# Patient Record
Sex: FEMALE | Race: WHITE
[De-identification: names, ages, dates, MRNs, and addresses within clinical notes are randomized per-mention and may not be internally consistent; named-entity substitution may affect disease eponyms.]

---

## 2017-07-05 ENCOUNTER — HOSPITAL ENCOUNTER (OUTPATIENT)
Dept: HOSPITAL 76 - DI | Age: 62
Discharge: HOME | End: 2017-07-05
Attending: FAMILY MEDICINE
Payer: COMMERCIAL

## 2017-07-05 DIAGNOSIS — Z12.31: Primary | ICD-10-CM

## 2017-07-05 PROCEDURE — 77067 SCR MAMMO BI INCL CAD: CPT

## 2017-07-06 NOTE — MAMMOGRAPHY REPORT
DIGITAL SCREENING MAMMOGRAM: 07/05/2017

 

CLINICAL INDICATION: A 62-year-old with history of late childbearing, for screening. 

 

COMPARISON: 01/2016, 11/2014, 09/2013, 09/2012, 06/2011, 05/2010, 02/2009, 10/2007. 

 

TECHNIQUE:  Routine CC and MLO projections were obtained of the breasts. Bilateral laterally exaggera
jessica craniocaudal views. 

 

FINDINGS:  Parenchymal tissue within both breasts is heterogeneously dense, which may lower the sensi
tivity of mammography; however, there are no dominant masses, suspicious microcalcifications, or seco
ndary signs of malignancy. In comparison to the previous studies, there are no significant changes.

 

ASSESSMENT:  NO MAMMOGRAPHIC EVIDENCE OF MALIGNANCY. NO SIGNIFICANT INTERVAL CHANGES. 

 

RECOMMENDATION:  Screening mammography is recommended annually.

 

BIRADS category 1 - negative.

 

STANDARD QUALIFYING STATEMENTS

1. This examination was reviewed with the aid of Computed-Aided Detection (CAD).

2. A negative or benign imaging report should not delay biopsy if clinically suspicious findings are 
present. Consider surgical consultation if warranted. More than 5% of cancers are not identified by i
maging.

3. Dense breasts may obscure an underlying neoplasm.

 

 

 

DD:07/06/2017 13:52:56  DT: 07/06/2017 16:18  JOB #: [id number]  EXT JOB #:[id number]

## 2018-06-07 ENCOUNTER — HOSPITAL ENCOUNTER (OUTPATIENT)
Dept: HOSPITAL 76 - SDS | Age: 63
Discharge: HOME | End: 2018-06-07
Attending: SURGERY
Payer: COMMERCIAL

## 2018-06-07 VITALS — SYSTOLIC BLOOD PRESSURE: 98 MMHG | DIASTOLIC BLOOD PRESSURE: 64 MMHG

## 2018-06-07 DIAGNOSIS — K31.7: ICD-10-CM

## 2018-06-07 DIAGNOSIS — Z12.11: Primary | ICD-10-CM

## 2018-06-07 DIAGNOSIS — J45.909: ICD-10-CM

## 2018-06-07 DIAGNOSIS — G43.909: ICD-10-CM

## 2018-06-07 DIAGNOSIS — K31.9: ICD-10-CM

## 2018-06-07 DIAGNOSIS — K64.8: ICD-10-CM

## 2018-06-07 DIAGNOSIS — K64.4: ICD-10-CM

## 2018-06-07 DIAGNOSIS — K21.9: ICD-10-CM

## 2018-06-07 LAB
ANION GAP SERPL CALCULATED.4IONS-SCNC: 8 MMOL/L (ref 6–13)
BASOPHILS NFR BLD AUTO: 0.1 10^3/UL (ref 0–0.1)
BASOPHILS NFR BLD AUTO: 1.3 %
BUN SERPL-MCNC: 10 MG/DL (ref 6–20)
CALCIUM UR-MCNC: 9 MG/DL (ref 8.5–10.3)
CHLORIDE SERPL-SCNC: 102 MMOL/L (ref 101–111)
CO2 SERPL-SCNC: 25 MMOL/L (ref 21–32)
CREAT SERPLBLD-SCNC: 0.9 MG/DL (ref 0.4–1)
EOSINOPHIL # BLD AUTO: 0.1 10^3/UL (ref 0–0.7)
EOSINOPHIL NFR BLD AUTO: 2.5 %
ERYTHROCYTE [DISTWIDTH] IN BLOOD BY AUTOMATED COUNT: 12.9 % (ref 12–15)
GFRSERPLBLD MDRD-ARVRAT: 64 ML/MIN/{1.73_M2} (ref 89–?)
GLUCOSE SERPL-MCNC: 102 MG/DL (ref 70–100)
HGB UR QL STRIP: 14.1 G/DL (ref 12–16)
LYMPHOCYTES # SPEC AUTO: 1.5 10^3/UL (ref 1.5–3.5)
LYMPHOCYTES NFR BLD AUTO: 34.7 %
MCH RBC QN AUTO: 32 PG (ref 27–31)
MCHC RBC AUTO-ENTMCNC: 33.7 G/DL (ref 32–36)
MCV RBC AUTO: 94.9 FL (ref 81–99)
MONOCYTES # BLD AUTO: 0.5 10^3/UL (ref 0–1)
MONOCYTES NFR BLD AUTO: 10.4 %
NEUTROPHILS # BLD AUTO: 2.3 10^3/UL (ref 1.5–6.6)
NEUTROPHILS # SNV AUTO: 4.4 X10^3/UL (ref 4.8–10.8)
NEUTROPHILS NFR BLD AUTO: 51.1 %
PDW BLD AUTO: 8.9 FL (ref 7.9–10.8)
PLATELET # BLD: 185 10^3/UL (ref 130–450)
RBC MAR: 4.4 10^6/UL (ref 4.2–5.4)
SODIUM SERPLBLD-SCNC: 135 MMOL/L (ref 135–145)

## 2018-06-07 PROCEDURE — 85025 COMPLETE CBC W/AUTO DIFF WBC: CPT

## 2018-06-07 PROCEDURE — 43239 EGD BIOPSY SINGLE/MULTIPLE: CPT

## 2018-06-07 PROCEDURE — 45378 DIAGNOSTIC COLONOSCOPY: CPT

## 2018-06-07 PROCEDURE — 88305 TISSUE EXAM BY PATHOLOGIST: CPT

## 2018-06-07 PROCEDURE — 80048 BASIC METABOLIC PNL TOTAL CA: CPT

## 2018-06-07 PROCEDURE — 0DJD8ZZ INSPECTION OF LOWER INTESTINAL TRACT, VIA NATURAL OR ARTIFICIAL OPENING ENDOSCOPIC: ICD-10-PCS | Performed by: SURGERY

## 2018-06-07 PROCEDURE — 0DB68ZZ EXCISION OF STOMACH, VIA NATURAL OR ARTIFICIAL OPENING ENDOSCOPIC: ICD-10-PCS | Performed by: SURGERY

## 2018-11-05 ENCOUNTER — HOSPITAL ENCOUNTER (OUTPATIENT)
Dept: HOSPITAL 76 - DI | Age: 63
Discharge: HOME | End: 2018-11-05
Attending: GENERAL ACUTE CARE HOSPITAL
Payer: COMMERCIAL

## 2018-11-05 DIAGNOSIS — Z12.31: Primary | ICD-10-CM

## 2018-11-05 PROCEDURE — 77063 BREAST TOMOSYNTHESIS BI: CPT

## 2018-11-05 PROCEDURE — 77067 SCR MAMMO BI INCL CAD: CPT

## 2018-11-06 NOTE — MAMMOGRAPHY REPORT
Reason:  SCREENING MAMMO

Procedure Date:  11/05/2018   

Accession Number:  [id number] / [id number]                    

Procedure:  ELISEO - Screening Mammo w/Sriram CPT Code:  

 

FULL RESULT:

 

 

EXAM: Screening Mammo w/Sriram

 

DATE: 11/5/2018 3:36 PM

 

CLINICAL HISTORY: 63-year-old female presents for screening mammogram.

 

TECHNIQUE: Bilateral CC and MLO views were obtained.

 

COMPARISON: 7/5/2017, 1/7/2000, 11/3/2014, 9/16/2013.

 

FINDINGS:

The breasts demonstrate heterogeneously dense fibroglandular parenchyma 

bilaterally. No suspicious masses, clustered microcalcifications, or 

regions of architectural distortion are identified.

 

IMPRESSION: Negative examination

 

RECOMMENDATION: Routine annual screening unless otherwise clinically 

indicated.

 

BIRADS CATEGORY 1: Negative

 

STANDARD QUALIFYING STATEMENTS:

1.  This examination was not reviewed with the aid of Computer-Aided 

Detection (CAD).

2.  A negative or benign  imaging report should not delay biopsy if 

clinically suspicious findings are present.  Consider surgical 

consultation if warrented.  More than 5% of cancers are not identified by 

imaging.

3.  Dense breasts may obscure an underlying neoplasm.

4. This examination was reviewed with the aid of 3D breast imaging 

(tomosynthesis).

## 2020-03-10 ENCOUNTER — HOSPITAL ENCOUNTER (OUTPATIENT)
Dept: HOSPITAL 76 - LAB | Age: 65
Discharge: HOME | End: 2020-03-10
Attending: FAMILY MEDICINE
Payer: COMMERCIAL

## 2020-03-10 DIAGNOSIS — E78.5: ICD-10-CM

## 2020-03-10 DIAGNOSIS — Z00.00: Primary | ICD-10-CM

## 2020-03-10 LAB
ALBUMIN DIAFP-MCNC: 4.4 G/DL (ref 3.2–5.5)
ALBUMIN/GLOB SERPL: 1.5 {RATIO} (ref 1–2.2)
ALP SERPL-CCNC: 47 IU/L (ref 42–121)
ALT SERPL W P-5'-P-CCNC: 15 IU/L (ref 10–60)
ANION GAP SERPL CALCULATED.4IONS-SCNC: 7 MMOL/L (ref 6–13)
AST SERPL W P-5'-P-CCNC: 17 IU/L (ref 10–42)
BASOPHILS NFR BLD AUTO: 0 10^3/UL (ref 0–0.1)
BASOPHILS NFR BLD AUTO: 0.7 %
BILIRUB BLD-MCNC: 0.9 MG/DL (ref 0.2–1)
BUN SERPL-MCNC: 21 MG/DL (ref 6–20)
CALCIUM UR-MCNC: 9 MG/DL (ref 8.5–10.3)
CHLORIDE SERPL-SCNC: 104 MMOL/L (ref 101–111)
CHOLEST SERPL-MCNC: 183 MG/DL
CO2 SERPL-SCNC: 27 MMOL/L (ref 21–32)
CREAT SERPLBLD-SCNC: 1 MG/DL (ref 0.4–1)
EOSINOPHIL # BLD AUTO: 0.2 10^3/UL (ref 0–0.7)
EOSINOPHIL NFR BLD AUTO: 3.9 %
ERYTHROCYTE [DISTWIDTH] IN BLOOD BY AUTOMATED COUNT: 12.2 % (ref 12–15)
GFRSERPLBLD MDRD-ARVRAT: 56 ML/MIN/{1.73_M2} (ref 89–?)
GLOBULIN SER-MCNC: 2.9 G/DL (ref 2.1–4.2)
GLUCOSE SERPL-MCNC: 114 MG/DL (ref 70–100)
HDLC SERPL-MCNC: 48 MG/DL
HDLC SERPL: 3.8 {RATIO} (ref ?–4.4)
HGB UR QL STRIP: 14.3 G/DL (ref 12–16)
LDLC SERPL CALC-MCNC: 117 MG/DL
LDLC/HDLC SERPL: 2.4 {RATIO} (ref ?–4.4)
LYMPHOCYTES # SPEC AUTO: 1.8 10^3/UL (ref 1.5–3.5)
LYMPHOCYTES NFR BLD AUTO: 30.2 %
MCH RBC QN AUTO: 32.2 PG (ref 27–31)
MCHC RBC AUTO-ENTMCNC: 33.1 G/DL (ref 32–36)
MCV RBC AUTO: 97.3 FL (ref 81–99)
MONOCYTES # BLD AUTO: 0.5 10^3/UL (ref 0–1)
MONOCYTES NFR BLD AUTO: 7.9 %
NEUTROPHILS # BLD AUTO: 3.4 10^3/UL (ref 1.5–6.6)
NEUTROPHILS # SNV AUTO: 5.9 X10^3/UL (ref 4.8–10.8)
NEUTROPHILS NFR BLD AUTO: 57.1 %
PDW BLD AUTO: 9.8 FL (ref 7.9–10.8)
PLATELET # BLD: 195 10^3/UL (ref 130–450)
PROT SPEC-MCNC: 7.3 G/DL (ref 6.7–8.2)
RBC MAR: 4.44 10^6/UL (ref 4.2–5.4)
SODIUM SERPLBLD-SCNC: 138 MMOL/L (ref 135–145)
VLDLC SERPL-SCNC: 18 MG/DL

## 2020-03-10 PROCEDURE — 80053 COMPREHEN METABOLIC PANEL: CPT

## 2020-03-10 PROCEDURE — 80061 LIPID PANEL: CPT

## 2020-03-10 PROCEDURE — 83721 ASSAY OF BLOOD LIPOPROTEIN: CPT

## 2020-03-10 PROCEDURE — 36415 COLL VENOUS BLD VENIPUNCTURE: CPT

## 2020-03-10 PROCEDURE — 85025 COMPLETE CBC W/AUTO DIFF WBC: CPT

## 2020-03-23 ENCOUNTER — HOSPITAL ENCOUNTER (OUTPATIENT)
Dept: HOSPITAL 76 - DI | Age: 65
Discharge: HOME | End: 2020-03-23
Attending: GENERAL ACUTE CARE HOSPITAL
Payer: COMMERCIAL

## 2020-03-23 DIAGNOSIS — Z12.31: Primary | ICD-10-CM

## 2020-03-23 PROCEDURE — 77063 BREAST TOMOSYNTHESIS BI: CPT

## 2020-03-23 PROCEDURE — 77067 SCR MAMMO BI INCL CAD: CPT

## 2021-03-31 ENCOUNTER — HOSPITAL ENCOUNTER (OUTPATIENT)
Dept: HOSPITAL 76 - DI.N | Age: 66
Discharge: HOME | End: 2021-03-31
Attending: GENERAL ACUTE CARE HOSPITAL
Payer: COMMERCIAL

## 2021-03-31 DIAGNOSIS — Z12.31: Primary | ICD-10-CM

## 2021-04-01 NOTE — MAMMOGRAPHY REPORT
BILATERAL DIGITAL SCREENING MAMMOGRAM 3D/2D: 3/31/2021

 

CLINICAL: Routine screening.  

 

Comparison is made to exams dated:  3/23/2020 mammogram, 11/5/2018 mammogram, 7/5/2017 mammogram, 1/7
/2016 mammogram, and 11/3/2014 mammogram - [facility].  The tissue of both breasts 
is heterogeneously dense. This may lower the sensitivity of mammography.  

 

No significant masses, calcifications, or other findings are seen in either breast.  

There has been no significant interval change.

 

IMPRESSION: NEGATIVE

There is no mammographic evidence of malignancy. A 1 year screening mammogram is recommended.  

 

 

This exam was interpreted at Station ID: [id number].  

 

NOTE: For mammograms, a report in lay terms will be sent to the patient. Approximately 15% of breast 
malignancies will not be visualized mammographically. In the management of a palpable breast mass, a 
negative mammogram must not discourage biopsy of a clinically suspicious lesion.

 

Electronically Signed By: Conchita jensen/renita:3/31/2021 14:00:24  

 

 

 

ACR BI-RADS Category 1: Negative 3341F

PARENCHYMAL PATTERN: (D) - The breast(s) demonstrate(s) heterogeneously dense fibroglandular alec live.

BI-RADS CATEGORY: (1) - 1

RECOMMENDATION: (ANNUAL)  - Recommend routine annual screening mammography.

[id number]

1 year screening

LATERALITY: (B)

## 2021-06-21 ENCOUNTER — HOSPITAL ENCOUNTER (OUTPATIENT)
Dept: HOSPITAL 76 - LAB | Age: 66
Discharge: HOME | End: 2021-06-21
Attending: FAMILY MEDICINE
Payer: COMMERCIAL

## 2021-06-21 DIAGNOSIS — E78.5: ICD-10-CM

## 2021-06-21 DIAGNOSIS — Z00.00: Primary | ICD-10-CM

## 2021-06-21 LAB
ALBUMIN DIAFP-MCNC: 4.3 G/DL (ref 3.2–5.5)
ALBUMIN/GLOB SERPL: 1.5 {RATIO} (ref 1–2.2)
ALP SERPL-CCNC: 51 IU/L (ref 42–121)
ALT SERPL W P-5'-P-CCNC: 22 IU/L (ref 10–60)
ANION GAP SERPL CALCULATED.4IONS-SCNC: 10 MMOL/L (ref 6–13)
AST SERPL W P-5'-P-CCNC: 20 IU/L (ref 10–42)
BASOPHILS NFR BLD AUTO: 0 10^3/UL (ref 0–0.1)
BASOPHILS NFR BLD AUTO: 0.9 %
BILIRUB BLD-MCNC: 0.8 MG/DL (ref 0.2–1)
BUN SERPL-MCNC: 26 MG/DL (ref 6–20)
CALCIUM UR-MCNC: 8.9 MG/DL (ref 8.5–10.3)
CHLORIDE SERPL-SCNC: 101 MMOL/L (ref 101–111)
CHOLEST SERPL-MCNC: 200 MG/DL
CO2 SERPL-SCNC: 25 MMOL/L (ref 21–32)
CREAT SERPLBLD-SCNC: 0.8 MG/DL (ref 0.4–1)
EOSINOPHIL # BLD AUTO: 0.3 10^3/UL (ref 0–0.7)
EOSINOPHIL NFR BLD AUTO: 5.4 %
ERYTHROCYTE [DISTWIDTH] IN BLOOD BY AUTOMATED COUNT: 12.8 % (ref 12–15)
GFRSERPLBLD MDRD-ARVRAT: 72 ML/MIN/{1.73_M2} (ref 89–?)
GLOBULIN SER-MCNC: 2.9 G/DL (ref 2.1–4.2)
GLUCOSE SERPL-MCNC: 113 MG/DL (ref 70–100)
HCT VFR BLD AUTO: 42.3 % (ref 37–47)
HDLC SERPL-MCNC: 45 MG/DL
HDLC SERPL: 4.4 {RATIO} (ref ?–4.4)
HGB UR QL STRIP: 13.7 G/DL (ref 12–16)
LDLC SERPL CALC-MCNC: 142 MG/DL
LDLC/HDLC SERPL: 3.2 {RATIO} (ref ?–4.4)
LYMPHOCYTES # SPEC AUTO: 1.6 10^3/UL (ref 1.5–3.5)
LYMPHOCYTES NFR BLD AUTO: 35 %
MCH RBC QN AUTO: 31.5 PG (ref 27–31)
MCHC RBC AUTO-ENTMCNC: 32.4 G/DL (ref 32–36)
MCV RBC AUTO: 97.2 FL (ref 81–99)
MONOCYTES # BLD AUTO: 0.5 10^3/UL (ref 0–1)
MONOCYTES NFR BLD AUTO: 11 %
NEUTROPHILS # BLD AUTO: 2.2 10^3/UL (ref 1.5–6.6)
NEUTROPHILS # SNV AUTO: 4.6 X10^3/UL (ref 4.8–10.8)
NEUTROPHILS NFR BLD AUTO: 47.5 %
NRBC # BLD AUTO: 0 /100WBC
NRBC # BLD AUTO: 0 X10^3/UL
PDW BLD AUTO: 10.5 FL (ref 7.9–10.8)
PLATELET # BLD: 194 10^3/UL (ref 130–450)
POTASSIUM SERPL-SCNC: 4.4 MMOL/L (ref 3.5–5)
PROT SPEC-MCNC: 7.2 G/DL (ref 6.7–8.2)
RBC MAR: 4.35 10^6/UL (ref 4.2–5.4)
SODIUM SERPLBLD-SCNC: 136 MMOL/L (ref 135–145)
T4 FREE SERPL-MCNC: 0.71 NG/DL (ref 0.58–1.64)
TRIGL P FAST SERPL-MCNC: 66 MG/DL
TSH SERPL-ACNC: 5.79 UIU/ML (ref 0.34–5.6)
VLDLC SERPL-SCNC: 13 MG/DL

## 2021-06-21 PROCEDURE — 36415 COLL VENOUS BLD VENIPUNCTURE: CPT

## 2021-06-21 PROCEDURE — 85025 COMPLETE CBC W/AUTO DIFF WBC: CPT

## 2021-06-21 PROCEDURE — 84443 ASSAY THYROID STIM HORMONE: CPT

## 2021-06-21 PROCEDURE — 80053 COMPREHEN METABOLIC PANEL: CPT

## 2021-06-21 PROCEDURE — 83721 ASSAY OF BLOOD LIPOPROTEIN: CPT

## 2021-06-21 PROCEDURE — 80061 LIPID PANEL: CPT

## 2021-06-21 PROCEDURE — 84439 ASSAY OF FREE THYROXINE: CPT
